# Patient Record
Sex: MALE | URBAN - METROPOLITAN AREA
[De-identification: names, ages, dates, MRNs, and addresses within clinical notes are randomized per-mention and may not be internally consistent; named-entity substitution may affect disease eponyms.]

---

## 2023-07-17 ENCOUNTER — OFFICE VISIT (OUTPATIENT)
Dept: URBAN - METROPOLITAN AREA CLINIC 33 | Facility: CLINIC | Age: 38
End: 2023-07-17
Payer: COMMERCIAL

## 2023-07-17 DIAGNOSIS — H33.329 ROUND HOLE, UNSPECIFIED EYE: ICD-10-CM

## 2023-07-17 DIAGNOSIS — H35.413 LATTICE DEGENERATION OF RETINA, BILATERAL: Primary | ICD-10-CM

## 2023-07-17 DIAGNOSIS — H04.123 DRY EYE SYNDROME OF BILATERAL LACRIMAL GLANDS: ICD-10-CM

## 2023-07-17 PROCEDURE — 99204 OFFICE O/P NEW MOD 45 MIN: CPT

## 2023-07-17 ASSESSMENT — KERATOMETRY
OS: 43.63
OD: 43.63

## 2023-07-17 ASSESSMENT — INTRAOCULAR PRESSURE
OD: 14
OS: 13

## 2023-07-17 NOTE — IMPRESSION/PLAN
Impression: Lattice degeneration of retina, bilateral: H35.413.
-(-)silvano sign OD, OS
-no signs of retinal detachments OD, OS
-asymptomatic Plan: Patient educated on ocular findings. Discussed symptoms of retinal detachment. Recommended patient to return to clinic if he/she notes new onset of floaters, flashes of light, and/or curtain of veil over vision immediately. Continue to monitor.